# Patient Record
Sex: FEMALE | Race: WHITE | NOT HISPANIC OR LATINO | Employment: OTHER | URBAN - METROPOLITAN AREA
[De-identification: names, ages, dates, MRNs, and addresses within clinical notes are randomized per-mention and may not be internally consistent; named-entity substitution may affect disease eponyms.]

---

## 2023-01-18 ENCOUNTER — TELEPHONE (OUTPATIENT)
Dept: SPORTS MEDICINE | Facility: CLINIC | Age: 63
End: 2023-01-18
Payer: COMMERCIAL

## 2023-01-18 ENCOUNTER — TELEPHONE (OUTPATIENT)
Dept: ORTHOPEDICS | Facility: CLINIC | Age: 63
End: 2023-01-18
Payer: COMMERCIAL

## 2023-01-18 DIAGNOSIS — M25.561 RIGHT KNEE PAIN, UNSPECIFIED CHRONICITY: Primary | ICD-10-CM

## 2023-01-18 NOTE — TELEPHONE ENCOUNTER
Pt stated that she has not had an MRI. I explained that we will schedule w/ non-op to determine if surgical eval is necessary. Pt understood and scheduled next day appt. Pt verbalized understanding of appt time, date, location, and xr arrival. She denied any Hx of Sx    ----- Message from Angie Tubbs sent at 1/18/2023  2:14 PM CST -----  Please call pt @ [phone] regarding referral for [facility] Dr Pederson, need to know if received.

## 2023-01-18 NOTE — TELEPHONE ENCOUNTER
Explained initial work up w/ non op since pt has next-day appt. Pt understood that she will be referred to Dr. Arriola if Dr. Del Cid suggests surgical opinion. Pt understood.     ----- Message from Mahendra Genao sent at 1/18/2023  3:48 PM CST -----  Pt is calling to see if dr. Duran sees knee pts . Pls call her back at [phone]   Thank you    Nils

## 2023-01-19 ENCOUNTER — HOSPITAL ENCOUNTER (OUTPATIENT)
Dept: RADIOLOGY | Facility: HOSPITAL | Age: 63
Discharge: HOME | End: 2023-01-19
Attending: STUDENT IN AN ORGANIZED HEALTH CARE EDUCATION/TRAINING PROGRAM
Payer: COMMERCIAL

## 2023-01-19 ENCOUNTER — OFFICE VISIT (OUTPATIENT)
Dept: SPORTS MEDICINE | Facility: CLINIC | Age: 63
End: 2023-01-19
Payer: COMMERCIAL

## 2023-01-19 VITALS — WEIGHT: 102.06 LBS | BODY MASS INDEX: 19.93 KG/M2

## 2023-01-19 DIAGNOSIS — M22.2X1 PATELLOFEMORAL PAIN SYNDROME OF RIGHT KNEE: ICD-10-CM

## 2023-01-19 DIAGNOSIS — M17.11 PRIMARY OSTEOARTHRITIS OF RIGHT KNEE: Primary | ICD-10-CM

## 2023-01-19 DIAGNOSIS — M25.561 RIGHT KNEE PAIN, UNSPECIFIED CHRONICITY: ICD-10-CM

## 2023-01-19 PROCEDURE — 73564 X-RAY EXAM KNEE 4 OR MORE: CPT | Mod: TC,RT

## 2023-01-19 PROCEDURE — 1159F PR MEDICATION LIST DOCUMENTED IN MEDICAL RECORD: ICD-10-PCS | Mod: CPTII,S$GLB,, | Performed by: STUDENT IN AN ORGANIZED HEALTH CARE EDUCATION/TRAINING PROGRAM

## 2023-01-19 PROCEDURE — 3008F PR BODY MASS INDEX (BMI) DOCUMENTED: ICD-10-PCS | Mod: CPTII,S$GLB,, | Performed by: STUDENT IN AN ORGANIZED HEALTH CARE EDUCATION/TRAINING PROGRAM

## 2023-01-19 PROCEDURE — 3008F BODY MASS INDEX DOCD: CPT | Mod: CPTII,S$GLB,, | Performed by: STUDENT IN AN ORGANIZED HEALTH CARE EDUCATION/TRAINING PROGRAM

## 2023-01-19 PROCEDURE — 1160F PR REVIEW ALL MEDS BY PRESCRIBER/CLIN PHARMACIST DOCUMENTED: ICD-10-PCS | Mod: CPTII,S$GLB,, | Performed by: STUDENT IN AN ORGANIZED HEALTH CARE EDUCATION/TRAINING PROGRAM

## 2023-01-19 PROCEDURE — 73564 X-RAY EXAM KNEE 4 OR MORE: CPT | Mod: 26,RT,, | Performed by: RADIOLOGY

## 2023-01-19 PROCEDURE — 1160F RVW MEDS BY RX/DR IN RCRD: CPT | Mod: CPTII,S$GLB,, | Performed by: STUDENT IN AN ORGANIZED HEALTH CARE EDUCATION/TRAINING PROGRAM

## 2023-01-19 PROCEDURE — 99999 PR PBB SHADOW E&M-EST. PATIENT-LVL III: ICD-10-PCS | Mod: PBBFAC,,, | Performed by: STUDENT IN AN ORGANIZED HEALTH CARE EDUCATION/TRAINING PROGRAM

## 2023-01-19 PROCEDURE — 73562 X-RAY EXAM OF KNEE 3: CPT | Mod: 26,LT,, | Performed by: RADIOLOGY

## 2023-01-19 PROCEDURE — 99999 PR PBB SHADOW E&M-EST. PATIENT-LVL III: CPT | Mod: PBBFAC,,, | Performed by: STUDENT IN AN ORGANIZED HEALTH CARE EDUCATION/TRAINING PROGRAM

## 2023-01-19 PROCEDURE — 73564 XR KNEE ORTHO RIGHT WITH FLEXION: ICD-10-PCS | Mod: 26,RT,, | Performed by: RADIOLOGY

## 2023-01-19 PROCEDURE — 99204 PR OFFICE/OUTPT VISIT, NEW, LEVL IV, 45-59 MIN: ICD-10-PCS | Mod: S$GLB,,, | Performed by: STUDENT IN AN ORGANIZED HEALTH CARE EDUCATION/TRAINING PROGRAM

## 2023-01-19 PROCEDURE — 73562 XR KNEE ORTHO RIGHT WITH FLEXION: ICD-10-PCS | Mod: 26,LT,, | Performed by: RADIOLOGY

## 2023-01-19 PROCEDURE — 99204 OFFICE O/P NEW MOD 45 MIN: CPT | Mod: S$GLB,,, | Performed by: STUDENT IN AN ORGANIZED HEALTH CARE EDUCATION/TRAINING PROGRAM

## 2023-01-19 PROCEDURE — 1159F MED LIST DOCD IN RCRD: CPT | Mod: CPTII,S$GLB,, | Performed by: STUDENT IN AN ORGANIZED HEALTH CARE EDUCATION/TRAINING PROGRAM

## 2023-01-19 RX ORDER — MELOXICAM 7.5 MG/1
7.5 TABLET ORAL 2 TIMES DAILY PRN
Qty: 60 TABLET | Refills: 2 | Status: SHIPPED | OUTPATIENT
Start: 2023-01-19

## 2023-01-19 NOTE — PROGRESS NOTES
Patient ID: Althea Marcial  YOB: 1960  MRN: [id number]    Chief Complaint: Pain and Swelling of the Right Knee    Referred By: Self    Occupation: Retired    History of Present Illness: Althea Marcial is a 62 y.o. female who presents today with right knee pain.     She complains of worsening knee pain since summer 2022 without injury.  She reports that the pain started after doing a lot of squatting and yardwork.  She has noticed intermittent pain and swelling and the presence of a knot in the back of her knee that changes in size and severity based on her activity.  Sometimes it will make the whole back of her leg swell.  Her symptoms are worse with prolonged sitting/driving, when lying down at night, and when there is direct pressure on the back of her knee.  When her knee is swollen she is unable to bend it.  She has self treated with Aleve.      Past Medical History:   Past Medical History:   Diagnosis Date    Basal cell carcinoma     Elevated liver enzymes     Ovarian cyst     RIGHT SIMPLE OVARIAN CYST (6 X 3.5 X 5.3 CM)     Past Surgical History:   Procedure Laterality Date     SECTION      DILATION AND CURETTAGE OF UTERUS      [facility]  2022    ovarian cyst     History reviewed. No pertinent family history.  Social History     Socioeconomic History    Marital status:    Tobacco Use    Smoking status: Never    Smokeless tobacco: Never   Substance and Sexual Activity    Alcohol use: Never    Drug use: Never    Sexual activity: Not Currently     Medication List with Changes/Refills   New Medications    MELOXICAM (MOBIC) 7.5 MG TABLET    Take 1 tablet (7.5 mg total) by mouth 2 (two) times daily as needed for Pain.   Current Medications    BIOTIN 10 MG TAB    1 tablet    CALCIUM-VITAMIN D3 (OS-ROBERTH 500 + D3) 500 MG-5 MCG (200 UNIT) PER TABLET    Take 1 tablet by mouth 2 (two) times daily with meals.    LORATADINE (CLARITIN) 5 MG/5 ML SYRUP    10 mLs.    MULTIVITAMIN (THERAGRAN)  PER TABLET    1 tablet    VITAMIN E 100 UNIT CAPSULE    1 capsule     Review of patient's allergies indicates:   Allergen Reactions    Aspirin     Penicillins Rash    Tetracyclines Rash       Physical Exam:   Body mass index is 19.93 kg/m².    Physical Exam  Constitutional:       General: She is not in acute distress.     Appearance: Normal appearance.   HENT:      Head: Normocephalic and atraumatic.   Eyes:      Extraocular Movements: Extraocular movements intact.      Conjunctiva/sclera: Conjunctivae normal.   Pulmonary:      Effort: Pulmonary effort is normal. No respiratory distress.   Skin:     General: Skin is warm and dry.   Neurological:      General: No focal deficit present.      Mental Status: She is alert and oriented to person, place, and time.   Psychiatric:         Mood and Affect: Mood normal.     Detailed MSK exam:      Right Knee:  Inspection: Genu valgus    No effusion    Small palpable popliteal cyst  Palpation tenderness: Medial joint line  Range of motion: 0 deg extension - 120 deg flexion  Strength:  5/5 Extension    5/5 Flexion  Stability: Stable ACL/Lachman      Stable Posterior Drawer      1+ with firm endpoint to Valgus Stress      Stable Varus Stress  Patella Exam: Negative J-sign   Negative Patellar apprehension   Negative Patellar crepitus   N/V Exam:  Tibial:    Normal sensory (plantar foot)  Normal motor (FHL)    Sup Peroneal:   Normal sensory (dorsal foot)  Normal motor (Peroneals)            Deep Peroneal:   Normal sensory (1st web space)  Normal motor (EHL)    Sural:   Normal sensory (lateral foot)   Saphenous:   Normal sensory (medial lower leg)   Normal pedal pulses, warm and well perfused with capillary refill < 2 sec       Imaging:  X-ray Knee Ortho Right with Flexion  Narrative: EXAMINATION:  XR KNEE ORTHO RIGHT WITH FLEXION    CLINICAL HISTORY:  Pain in right knee    TECHNIQUE:  Standing AP, standing PA flexion, and Merchant views were obtained of the bilateral knees.   Standing lateral view of the right knee was obtained.    COMPARISON:  None.    FINDINGS:  No acute fractures or dislocations visualized.  A small joint effusion is present on the right.  There is bilateral lateral patellar tilt, worse on the right.  Joint spaces are preserved.  Impression: As Above    Electronically signed by: Alfonso Benavidez MD  Date:    01/19/2023  Time:    08:14      Relevant imaging results were reviewed and interpreted by me and per my read:  Well-preserved joint spaces, bilaterally.  There is some subchondral sclerosis in the medial compartments, bilaterally.  No significant osteophytic changes.  Lateral patellar tilt, bilaterally.  Normal alignment.  No fractures or other acute abnormalities    This was discussed with the patient and / or family today.       Patient Instructions   Assessment:  Althea Marcial is a 62 y.o. female with a chief complaint of Pain and Swelling of the Right Knee    Encounter Diagnoses   Name Primary?    Primary osteoarthritis of right knee Yes    Patellofemoral pain syndrome of right knee       Plan:  XR reviewed - some mild medial joint space sclerosis, but overall well-maintained joint spaces, minimal osteophytic changes, and lateral patellar tilt  The patient's history, clinical exam, and imaging findings are consistent with early osteoarthritis and patellofemoral pain syndrome of the right knee, with resultant Baker cyst  Recommend for conservative management  Prescribe Mobic 7.5mg once or twice daily as needed, we will take b.i.d. for the next 2 weeks, then down to b.i.d. p.r.n. after that  Order PT at [facility] - 2-3x per week for 6-8 weeks   Recommend she obtain over-the-counter compression sleeve  Can consider CSI, Baker cyst aspiration in future if not responding    Follow-up: 3 months or sooner if there are any problems between now and then.    Thank you for choosing [facility] and Dr. Aubrey Del Cid for your orthopedic &  sports medicine care. It is our goal to provide you with exceptional care that will help keep you healthy, active, and get you back in the game.    Please do not hesitate to reach out to us via email, phone, or MyChart with any questions, concerns, or feedback.    If you are experiencing pain/discomfort ,or have questions after 5pm and would like to be connected to the [facility] on-call team, please call this number and specify which Sports Medicine provider is treating you: [phone]          A copy of today's visit note has been sent to the referring provider.       Aubrey Del Cid MD  Primary Care Sports Medicine        Disclaimer: This note was prepared using a voice recognition system and is likely to have sound alike errors within the text.

## 2023-01-19 NOTE — PATIENT INSTRUCTIONS
Assessment:  Althea Marcial is a 62 y.o. female with a chief complaint of Pain and Swelling of the Right Knee    Encounter Diagnoses   Name Primary?    Primary osteoarthritis of right knee Yes    Patellofemoral pain syndrome of right knee       Plan:  XR reviewed - some mild medial joint space sclerosis, but overall well-maintained joint spaces, minimal osteophytic changes, and lateral patellar tilt  The patient's history, clinical exam, and imaging findings are consistent with early osteoarthritis and patellofemoral pain syndrome of the right knee, with resultant Baker cyst  Recommend for conservative management  Prescribe Mobic 7.5mg once or twice daily as needed, we will take b.i.d. for the next 2 weeks, then down to b.i.d. p.r.n. after that  Order PT at [facility] - 2-3x per week for 6-8 weeks   Recommend she obtain over-the-counter compression sleeve  Can consider CSI, Baker cyst aspiration in future if not responding    Follow-up: 3 months or sooner if there are any problems between now and then.    Thank you for choosing [facility] and Dr. Aubrey Del Cid for your orthopedic & sports medicine care. It is our goal to provide you with exceptional care that will help keep you healthy, active, and get you back in the game.    Please do not hesitate to reach out to us via email, phone, or MyChart with any questions, concerns, or feedback.    If you are experiencing pain/discomfort ,or have questions after 5pm and would like to be connected to the [facility] on-call team, please call this number and specify which Sports Medicine provider is treating you: [phone]

## 2023-01-20 ENCOUNTER — CLINICAL SUPPORT (OUTPATIENT)
Dept: REHABILITATION | Facility: HOSPITAL | Age: 63
End: 2023-01-20
Attending: STUDENT IN AN ORGANIZED HEALTH CARE EDUCATION/TRAINING PROGRAM
Payer: COMMERCIAL

## 2023-01-20 DIAGNOSIS — M22.2X1 PATELLOFEMORAL PAIN SYNDROME OF RIGHT KNEE: ICD-10-CM

## 2023-01-20 DIAGNOSIS — R68.89 DECREASED STRENGTH, ENDURANCE, AND MOBILITY: ICD-10-CM

## 2023-01-20 DIAGNOSIS — Z74.09 DECREASED STRENGTH, ENDURANCE, AND MOBILITY: ICD-10-CM

## 2023-01-20 DIAGNOSIS — M17.11 PRIMARY OSTEOARTHRITIS OF RIGHT KNEE: ICD-10-CM

## 2023-01-20 DIAGNOSIS — R53.1 DECREASED STRENGTH, ENDURANCE, AND MOBILITY: ICD-10-CM

## 2023-01-20 PROCEDURE — 97112 NEUROMUSCULAR REEDUCATION: CPT

## 2023-01-20 PROCEDURE — 97110 THERAPEUTIC EXERCISES: CPT

## 2023-01-20 PROCEDURE — 97162 PT EVAL MOD COMPLEX 30 MIN: CPT

## 2023-01-20 NOTE — PLAN OF CARE
[facility]   Physical Therapy Initial Evaluation   Date: 1/20/2023   Name: Althea Marcial  Clinic Number: [id number]    Therapy Diagnosis:    Encounter Diagnoses   Name Primary?    Primary osteoarthritis of right knee     Patellofemoral pain syndrome of right knee     Decreased strength, endurance, and mobility       Physician: Aubrey Del Cid MD     Physician Orders: PT Eval and Treat  Medical Diagnosis from Referral: M17.11 (ICD-10-CM) - Primary osteoarthritis of right knee  M22.2X1 (ICD-10-CM) - Patellofemoral pain syndrome of right knee  Evaluation Date: 1/20/2023  Authorization Period Expiration: 12/31/2023  Plan of Care Expiration: 4/20/2023  Progress Note Due: 2/20/2023  Visit # / Visits authorized: 1/1   FOTO: 1/3 (last performed on 1/20/2023)    Precautions: Standard    Time In: 1206  Time Out: 1300  Total Billable Time (timed & untimed codes): 54 minutes    SUBJECTIVE   Date of onset: Spring/Summer 2022    History of current condition - Althea reports that around Spring/Summer of 2022 she did a lot of squatting in the yard when doing yard work one day and a few days later began having pain. Patient later revealed that around this time she had a stress fracture occur in her left foot which required her to be in a boot for a period of time and after getting out of the boot is when she did this labor some day of yard work. Patient reports that she has never had pain when squatting before then. Patient has young grandchildren and is very active in their lives. She has noticed that she on the floor with them with her knee in a hyper flexed position, when kneeling on her knee or when transitioning from sweetie cross sitting to standing she feels pressure and pain in her right knee. Patient reports that the knee pain comes and goes.  Patient reports that her parents are older and their health is declining so she began travelling to [place] more to visit them and that is when she noticed  her knee pain would bother her when riding in the car for long durations. Patient's father passed away in October so she is not travelling there as frequently. Patient reports that she had a hysterectomy following this as she has a cyst on her ovary that was worrisome although was benign. Patient noticed that the immobility following this procedure followed by mobility is what flared up her knee enough to see her MD for it.     Imaging: [x] Xray [] MRI [] CT: Performed on: Knee 2022    Pain:  Current 0/10, worst 7/10, best 0/10   Location: [x] Right   [] Left:  knee  Description: Aching and Throbbing  Aggravating Factors: Squatting, sitting for long periods of time  Easing Factors: activity avoidance, rest    Prior Therapy:   [x] N/A    [] Yes:   Social History: Pt lives with their spouse  Occupation: Pt is retired.   Prior Level of Function: Independent and pain free with all ADL, IADL, community mobility and functional activities.   Current Level of Function: Independent with all ADL, IADL, community mobility and functional activities with reports of increased pain and need for increased time and frequent breaks.  By the end of the day will have a good bit of swelling in her right knee with increased pain as well. Is unable to squat currently. Has difficulty when right knee is in hyper flexed position, putting pressure through knee as when kneeling and when riding in a car for long durations.     Dominant Extremity:    [x] Right    [] Left    Pts goals: Pt reported goals are to decrease overall pain levels in order to return to prior functional level.     Medical History:   Past Medical History:   Diagnosis Date    Basal cell carcinoma     Elevated liver enzymes     Ovarian cyst     RIGHT SIMPLE OVARIAN CYST (6 X 3.5 X 5.3 CM)       Surgical History:   Althea Marcial  has a past surgical history that includes  section; Dilation and curettage of uterus; and RALH BSO (2022).    Medications:   Althea  has a current medication list which includes the following prescription(s): biotin, calcium-vitamin d3, loratadine, meloxicam, multivitamin, and vitamin e.    Allergies:   Review of patient's allergies indicates:   Allergen Reactions    Aspirin     Penicillins Rash    Tetracyclines Rash        OBJECTIVE     Range of Motion:    Hip and Ankle Mobility was screened and was normal and pain free    Knee AROM/PROM Right Left Pain/Dysfunction with Movement Goal   Knee Flexion (135º) 135 138 Pain in right knee    Knee Extension (0º) 0 0 Pressure in right knee       Strength:    L/E MMT Right  (spine) Left Pain/Dysfunction with Movement Goal   Hip Flexion  3+/5 4-/5 No pain 4+/5 B   Hip Extension  3+/5 4-/5 No pain 4+/5 B   Hip Abduction  3+/5 3+/5 No pain 4+/5 B   Knee Extension 4-/5 4/5 No pain 5/5 B   Knee Flexion 3+/5 3+/5 No pain 5/5 B   Hip IR 3+/5 3+/5 No pain 4+/5 B   Hip ER 3+/5 3+/5 No pain 4+/5 B   Ankle DF 4-/5 4-/5 No pain 5/5 B   Ankle PF 4-/5 4-/5 No pain 5/5 B     Muscle Length:       Muscle Tested  Right Left  Goal   Hamstrings  [] Normal      [x] Limited [] Normal      [x] Limited Normal B   Gastrocnemius  [] Normal      [x] Limited [] Normal      [x] Limited Normal B     Joint Mobility:     Joint Motion Right Mobility  (spine) Left Mobility Goal   Knee Distraction  [x] Hypo     [] Normal     [] Hyper [] Hypo     [x] Normal     [] Hyper Normal    Distal Femur AP [x] Hypo     [] Normal     [] Hyper [] Hypo     [x] Normal     [] Hyper Normal    Proximal Tibia AP [x] Hypo     [] Normal     [] Hyper [] Hypo     [x] Normal     [] Hyper Normal    Patellar Medial Glide  [x] Hypo     [] Normal     [] Hyper [] Hypo     [x] Normal     [] Hyper Normal    Patellar Lateral Glide  [x] Hypo     [] Normal     [] Hyper [] Hypo     [x] Normal     [] Hyper Normal    Patellar Superior Glide  [x] Hypo     [] Normal     [] Hyper [] Hypo     [x] Normal     [] Hyper Normal    Patellar Inferior Glide  [x] Hypo     [] Normal     []  Hyper [] Hypo     [x] Normal     [] Hyper Normal      Sensation:  [x] Intact to Light Touch   [] Impaired:    Palpation: Increased tone noted with palpation of right quadriceps, vastus medialis/VMO, medial hamstrings, hip adductors , and popliteus. Increased tenderness with palpation of the following structures: medial tibiofemoral joint line , lateral tibiofemoral joint line , and pes anserine     Posture:  Pt presents with postural abnormalities which include:    [x] Forward Head   [] Increased Lumbar Lordosis   [x] Rounded Shoulder   [] Genu Recurvatum   [] Increased Thoracic Kyphosis [x] Genu Valgus   [] Trunk Deviated    [] Pes Planus   [] Scapular Winging    [] Other:       PT Functional Testing: Gait Analysis: The patient ambulated with the following assistive device: none; the pt presents with the following gait abnormalities: trendelenburg, decreased knee flexion on right, and decreased knee extension on right,     Functional Movement  Analysis Notes   Sit to Stand []Functional  [x]Dysfunctional:  [x]Painful  []Non-Painful    Dynamic valgus present with knees touching are mid range secondary to weakness   Squat []Functional  [x]Dysfunctional:  [x]Painful  []Non-Painful    Dynamic valgus present with knees touching are mid range secondary to weakness   Single Leg Squat  []Functional  []Dysfunctional:  []Painful  []Non-Painful    Not tested today   Step Down  []Functional  []Dysfunctional:  []Painful  []Non-Painful    Not tested today     Function:     CMS Impairment/Limitation/Restriction for FOTO Knee Survey    Therapist reviewed FOTO scores for Althea on 1/20/2023.   FOTO documents entered into [employer] - see Media section.    Limitation Score: 39%         TREATMENT     Total Treatment time (time-based codes) separate from Evaluation: (18) minutes     Althea received the treatments listed below:      THERAPEUTIC EXERCISES to develop strength, endurance, ROM, flexibility, posture, and core stabilization for (10)  minutes including:    Intervention Performed Today    Educated patient on the importance of improving lower extremity strength bilaterally while also normalizing movement patterns x Plan to look at left foot more extensively in coming visits to assess deficits/impairments there as this could also be part of the reason for her continued pain   Educated patient on the proper form and sequencing of all exercises provided in HEP today x      Plan for Next Visit: Hip 7 Ways completed, standing hip 3 ways, bridges, prone hip extension, squats       NEUROMUSCULAR RE-EDUCATION ACTIVITIES to improve Balance, Coordination, Kinesthetic, Sense, Proprioception, and Posture for (8) minutes.  The following were included:    Intervention Performed Today    Educated patient on the proper form and sequencing of a squats with time spent correcting and improving current form as to not exacerbate symptoms further x    Educated patient on putting car in cruise control when safe and appropriate for short durations to allow for right knee mobility intermittently through long car rides x      PATIENT EDUCATION AND HOME EXERCISES     Education provided: (included in billable time)   PURPOSE: Patient educated on the impairments noted above and the effects of physical therapy intervention to improve overall condition and QOL.   EXERCISE: Patient was educated on all the above exercise prior/during/after for proper posture, positioning, and execution for safe performance with home exercise program.   STRENGTH: Patient educated on the importance of improved core and extremity strength in order to improve alignment of the spine and extremities with static positions and dynamic movement.   GAIT & BALANCE: Patient educated on the importance of strong core and lower extremity musculature in order to improve both static and dynamic balance, improve gait mechanics, reduce fall risk and improve household and community mobility.   POSTURE: Patient  educated on postural awareness to reduce stress and maintain optimal alignment of the spine with static positions and dynamic movement   TRANSFERS & TRANSITIONS: Patient educated on proper technique for bed mobility, transitions and transfers to improve body mechanics and decrease risk of injury.     Written Home Exercises Provided: yes.  Exercises were reviewed and Althea was able to demonstrate them prior to the end of the session.  Althea demonstrated good  understanding of the education provided. See EMR under Patient Instructions for exercises provided during therapy sessions.    ASSESSMENT     Althea is a 62 y.o. female referred to outpatient Physical Therapy with a medical diagnosis of M17.11 (ICD-10-CM) - Primary osteoarthritis of right knee, M22.2X1 (ICD-10-CM) - Patellofemoral pain syndrome of right knee. Pt presents with impairments including: decreased ROM, decreased strength, decreased joint mobility, decreased muscle length, impaired coordination, impaired balance, postural abnormalities, gait abnormalities, and decreased overall function resulting in decreased quality of life and functional independence.     Pt prognosis is Excellent.   Pt will benefit from skilled outpatient Physical Therapy to address the deficits stated above and in the chart below, provide pt/family education, and to maximize pt's level of independence.     Plan of care discussed with patient: Yes  Pt's spiritual, cultural and educational needs considered and patient is agreeable to the plan of care and goals as stated below:     Anticipated Barriers for therapy: chronicity of condition    Medical Necessity is demonstrated by the following  History  Co-morbidities and personal factors that may impact the plan of care Co-morbidities:   prior abdominal surgery and chronicity of condition, left foot stress fracture    Personal Factors:   []Age   []Education   []Coping style   [x]Social background   [x]Lifestyle    []Character   []Attitudes  "  []Other:   []No deficits      []Low   [x]Moderate  []High    Examination  Body Structures and Functions, activity limitations and participation restrictions that may impact the plan of care Body Regions:   []Head  []Neck   []Back   []Trunk  []Upper extremities   [x]Lower extremities   []Other:      Body Systems:    [x]Gross symmetry   [x]ROM   [x]Strength   [x]Gross coordinated movement   [x]Balance   [x]Gait [x]Transfers  [x]Transitions   [x]Motor control   [x]Motor learning   []Scar formation  []Other:       Participation Restrictions:   See above in "Current Level of Function"     Activity limitations:   Learning and applying knowledge  [x]No deficits       General Tasks and Commands  [x]No deficits    Communication  [x]No deficits    Mobility   []No deficits  []Fine hand use  [x]Walking   [x]Driving [x]Lifting/carrying objects  []Using Transportation   []Moving around using equipment  []Other:      Self care  []No deficits  []Washing oneself   []Caring for body parts   []Toileting   [x]Dressing  []Eating   []Drinking   []Looking after one's health  []Other:        Domestic Life  []No Deficits  [x]Shopping   [x]Cooking  [x]Doing housework  [x]Assisting others   [x]Other: yardwork, playing with grandchildren     Interactions/Relationships  [x]No deficits    Life Areas  [x]No deficits    Community and Social Life  [x]Community life  [x]Recreation and leisure   []Scientologist and spirituality  []Human rights   []Political life / citizenship  []No deficits      []Low   [x]Moderate  []High    Clinical Presentation []Stable and uncomplicated   [x]Evolving presentation with changing characteristics  []Unstable presentation with unpredictable characteristics []Low   [x]Moderate  []High      Decision Making/ Complexity Score:   []Low   [x]Moderate  []High        Short Term Goals:  6 weeks Status  Date Met   PAIN: Pt will report worst pain of 4/10 in order to progress toward max functional ability and improve quality of " life. [x] Progressing  [] Met  [] Not Met    FUNCTION: Patient will demonstrate improved function as indicated by a functional limitation score of less than or equal to 32%  on FOTO. [x] Progressing  [] Met  [] Not Met    STRENGTH: Patient will improve strength to 50% of stated goals, listed in objective measures above, in order to progress towards independence with functional activities. [x] Progressing  [] Met  [] Not Met    POSTURE: Patient will correct postural deviations in sitting and standing, to decrease pain and promote long term stability.  [x] Progressing  [] Met  [] Not Met    HEP: Patient will demonstrate independence with HEP in order to progress toward functional independence. [x] Progressing  [] Met  [] Not Met    Will assess left foot to see if any deficits are present that could be aiding in current pain and dysfunction to assist in long term improvements in pain.  [x] Progressing  [] Met  [] Not Met      Long Term Goals:  12 weeks Status Date Met   PAIN: Pt will report worst pain of 1/10 in order to progress toward max functional ability and improve quality of life [x] Progressing  [] Met  [] Not Met    FUNCTION: Patient will demonstrate improved function as indicated by a functional limitation score of less than or equal to 27% on FOTO. [x] Progressing  [] Met  [] Not Met    STRENGTH: Patient will improve strength to stated goals, listed in objective measures above, in order to improve functional independence and quality of life. [x] Progressing  [] Met  [] Not Met    GAIT: Patient will demonstrate normalized gait mechanics with minimal compensation in order to return to PLOF. [x] Progressing  [] Met  [] Not Met    Patient will return to normal ADL's, IADL's, community involvement, recreational activities, and work-related activities with less than or equal to 1/10 pain and maximal function.  [x] Progressing  [] Met  [] Not Met      PLAN   Plan of care Certification: 1/20/2023 to  4/20/2023.    Outpatient Physical Therapy 1-2 times weekly for 12 weeks to include any combination of the following interventions: virtual visits, dry needling, modalities, electrical stimulation (IFC, Pre-Mod, Attended with Functional Dry Needling), Aquatic Therapy, Cervical/Lumbar Traction, Electrical Stimulation unattended/attended, Gait Training, Manual Therapy, Neuromuscular Re-ed, Patient Education, Self Care, Therapeutic Exercise, and Therapeutic Activites     Gay Hunter, PT, DPT      I CERTIFY THE NEED FOR THESE SERVICES FURNISHED UNDER THIS PLAN OF TREATMENT AND WHILE UNDER MY CARE   Physician's comments:     Physician's Signature: ___________________________________________________

## 2023-01-26 ENCOUNTER — CLINICAL SUPPORT (OUTPATIENT)
Dept: REHABILITATION | Facility: HOSPITAL | Age: 63
End: 2023-01-26
Payer: COMMERCIAL

## 2023-01-26 DIAGNOSIS — Z74.09 DECREASED STRENGTH, ENDURANCE, AND MOBILITY: Primary | ICD-10-CM

## 2023-01-26 DIAGNOSIS — R68.89 DECREASED STRENGTH, ENDURANCE, AND MOBILITY: Primary | ICD-10-CM

## 2023-01-26 DIAGNOSIS — R53.1 DECREASED STRENGTH, ENDURANCE, AND MOBILITY: Primary | ICD-10-CM

## 2023-01-26 PROCEDURE — 97112 NEUROMUSCULAR REEDUCATION: CPT

## 2023-01-26 PROCEDURE — 97110 THERAPEUTIC EXERCISES: CPT

## 2023-01-26 NOTE — PROGRESS NOTES
[facility]   Physical Therapy Treatment Note     Name: Althea HOLLIS [facility]  Clinic Number: [id number]    Therapy Diagnosis:   Encounter Diagnosis   Name Primary?    Decreased strength, endurance, and mobility Yes     Physician: Aubrey Del Cid MD    Visit Date: 1/26/2023    Physician Orders: PT Eval and Treat  Medical Diagnosis from Referral: M17.11 (ICD-10-CM) - Primary osteoarthritis of right knee  M22.2X1 (ICD-10-CM) - Patellofemoral pain syndrome of right knee  Evaluation Date: 1/20/2023  Authorization Period Expiration: 12/31/2023  Plan of Care Expiration: 4/20/2023  Progress Note Due: 2/20/2023  Visit # / Visits authorized: 1/20 (+1 for evaluation)  FOTO: 1/3 (performed on 1/20/2023)    PTA Visit #: 0/5     Progress Note Due on 2/20/2023    Time In: 1347  Time Out: 1438  Total Billable Time: 49 minutes    Precautions: Standard    SUBJECTIVE     Pt reports: that she is having a little pain and burning in her anterior inferior knee. Patient drove to see her mother Tuesday and drove back yesterday in which her pain reached up to 5/10 which is improved per patient report.     She was compliant with home exercise program.  Response to previous treatment: No adverse reactions  Functional change: Less pain when sitting for long car ride    Pain: 1/10  Location: [x] Right   [] Left:  knee    OBJECTIVE     Objective Measures updated at progress report unless specified.     Treatment     Althea received the treatments listed below:     THERAPEUTIC EXERCISES to develop strength, endurance, ROM, flexibility, posture, and core stabilization for (10) minutes including:    Intervention Performed Today    Straight Leg Raise  x 2 lbs 2x10 reps                                  Recumbent Bike x 6 minutes level 3     Plan for Next Visit:      NEUROMUSCULAR RE-EDUCATION ACTIVITIES to improve Balance, Coordination, Kinesthetic, Sense, Proprioception, and Posture for (39) minutes.  The following were  included:    Intervention Performed Today    Side Lying Hip Series (progressed) x X15 reps each direction (7 ways)   Side Lying Clams (progressed) x Green theraband 5 second holds x10 reps B   Educated patient on proper form and sequencing of all exercises in HEP as well as modifications x                                Plan for Next Visit: standing hip 3 ways, bridges, prone hip extension, squats      Patient Education and Home Exercises     Home Exercises Provided and Patient Education Provided     Education provided:   - PURPOSE: Patient educated on the impairments noted above and the effects of physical therapy intervention to improve overall condition and QOL.   EXERCISE: Patient was educated on all the above exercise prior/during/after for proper posture, positioning, and execution for safe performance with home exercise program.   STRENGTH: Patient educated on the importance of improved core and extremity strength in order to improve alignment of the spine and extremities with static positions and dynamic movement.   GAIT & BALANCE: Patient educated on the importance of strong core and lower extremity musculature in order to improve both static and dynamic balance, improve gait mechanics, reduce fall risk and improve household and community mobility.   POSTURE: Patient educated on postural awareness to reduce stress and maintain optimal alignment of the spine with static positions and dynamic movement     Written Home Exercises Provided: Patient instructed to cont prior HEP. Exercises were reviewed and Althea was able to demonstrate them prior to the end of the session.  Althea demonstrated good  understanding of the education provided. See EMR under Patient Instructions for exercises provided during therapy sessions      ASSESSMENT     Patient tolerated treatment well today with improvement of pain by the end of the session. Went through HEP exercises to ensure proper form and sequencing and made modifications  from how she had been performing at home for better muscle engagement. Patient with considerable fatigue although more prominent on left than right.     Althea Is progressing well towards her goals.   Pt prognosis is Excellent.     Pt will continue to benefit from skilled outpatient physical therapy to address the deficits listed in the problem list box on initial evaluation, provide pt/family education and to maximize pt's level of independence in the home and community environment.     Pt's spiritual, cultural and educational needs considered and pt agreeable to plan of care and goals.     Anticipated barriers to physical therapy: chronicity of condition    Goals:      Short Term Goals:  6 weeks Status  Date Met   PAIN: Pt will report worst pain of 4/10 in order to progress toward max functional ability and improve quality of life. [x] Progressing  [] Met  [] Not Met     FUNCTION: Patient will demonstrate improved function as indicated by a functional limitation score of less than or equal to 32%  on FOTO. [x] Progressing  [] Met  [] Not Met     STRENGTH: Patient will improve strength to 50% of stated goals, listed in objective measures above, in order to progress towards independence with functional activities. [x] Progressing  [] Met  [] Not Met     POSTURE: Patient will correct postural deviations in sitting and standing, to decrease pain and promote long term stability.  [x] Progressing  [] Met  [] Not Met     HEP: Patient will demonstrate independence with HEP in order to progress toward functional independence. [x] Progressing  [] Met  [] Not Met     Will assess left foot to see if any deficits are present that could be aiding in current pain and dysfunction to assist in long term improvements in pain.  [x] Progressing  [] Met  [] Not Met        Long Term Goals:  12 weeks Status Date Met   PAIN: Pt will report worst pain of 1/10 in order to progress toward max functional ability and improve quality of life [x]  Progressing  [] Met  [] Not Met     FUNCTION: Patient will demonstrate improved function as indicated by a functional limitation score of less than or equal to 27% on FOTO. [x] Progressing  [] Met  [] Not Met     STRENGTH: Patient will improve strength to stated goals, listed in objective measures above, in order to improve functional independence and quality of life. [x] Progressing  [] Met  [] Not Met     GAIT: Patient will demonstrate normalized gait mechanics with minimal compensation in order to return to PLOF. [x] Progressing  [] Met  [] Not Met     Patient will return to normal ADL's, IADL's, community involvement, recreational activities, and work-related activities with less than or equal to 1/10 pain and maximal function.  [x] Progressing  [] Met  [] Not Met          PLAN     Continue POC and frequency as planned. Continue to progress LE strength and conditioning program to tolerance.      These services are reasonable and necessary for the conditions set forth above while under my care.    Gya Hunter, PT, DPT

## 2023-02-03 ENCOUNTER — CLINICAL SUPPORT (OUTPATIENT)
Dept: REHABILITATION | Facility: HOSPITAL | Age: 63
End: 2023-02-03
Payer: COMMERCIAL

## 2023-02-03 DIAGNOSIS — R53.1 DECREASED STRENGTH, ENDURANCE, AND MOBILITY: Primary | ICD-10-CM

## 2023-02-03 DIAGNOSIS — R68.89 DECREASED STRENGTH, ENDURANCE, AND MOBILITY: Primary | ICD-10-CM

## 2023-02-03 DIAGNOSIS — Z74.09 DECREASED STRENGTH, ENDURANCE, AND MOBILITY: Primary | ICD-10-CM

## 2023-02-03 PROCEDURE — 97112 NEUROMUSCULAR REEDUCATION: CPT

## 2023-02-03 PROCEDURE — 97110 THERAPEUTIC EXERCISES: CPT

## 2023-02-03 NOTE — PROGRESS NOTES
[facility]   Physical Therapy Treatment Note     Name: Althea HOLLIS [facility]  Clinic Number: [id number]    Therapy Diagnosis:   Encounter Diagnosis   Name Primary?    Decreased strength, endurance, and mobility Yes     Physician: Aubrey Del Cid MD    Visit Date: 2/3/2023    Physician Orders: PT Eval and Treat  Medical Diagnosis from Referral: M17.11 (ICD-10-CM) - Primary osteoarthritis of right knee  M22.2X1 (ICD-10-CM) - Patellofemoral pain syndrome of right knee  Evaluation Date: 1/20/2023  Authorization Period Expiration: 12/31/2023  Plan of Care Expiration: 4/20/2023  Progress Note Due: 2/20/2023  Visit # / Visits authorized: 1/20 (+1 for evaluation)  FOTO: 1/3 (performed on 1/20/2023)    PTA Visit #: 0/5     Progress Note Due on 2/20/2023    Time In: 1117  Time Out: 1202  Total Billable Time: 42 minutes    Precautions: Standard    SUBJECTIVE     Pt reports: that she squatted down for a while to do something and is now aching a little bit more.     She was compliant with home exercise program.  Response to previous treatment: No adverse reactions  Functional change: Less pain when sitting for long car ride    Pain: 3/10  Location: [x] Right   [] Left:  knee    OBJECTIVE     Objective Measures updated at progress report unless specified.     Treatment     Althea received the treatments listed below:     THERAPEUTIC EXERCISES to develop strength, endurance, ROM, flexibility, posture, and core stabilization for (15) minutes including:    Intervention Performed Today    Straight Leg Raise  x 2 lbs 2x10 reps    Shuttle (added) x Double Leg 6 bands 3x10 reps                             Recumbent Bike x 6 minutes level 3     Plan for Next Visit:      NEUROMUSCULAR RE-EDUCATION ACTIVITIES to improve Balance, Coordination, Kinesthetic, Sense, Proprioception, and Posture for (27) minutes.  The following were included:    Intervention Performed Today    Side Lying Hip Series (progressed) x X10 reps each  direction (7 ways)   Side Lying Clams (progressed) x Green theraband 5 second holds x10 reps B   Educated patient on proper form and sequencing of all exercises in HEP as well as modifications                                 Plan for Next Visit: standing hip 3 ways, bridges, prone hip extension, squats      Patient Education and Home Exercises     Home Exercises Provided and Patient Education Provided     Education provided:   - PURPOSE: Patient educated on the impairments noted above and the effects of physical therapy intervention to improve overall condition and QOL.   EXERCISE: Patient was educated on all the above exercise prior/during/after for proper posture, positioning, and execution for safe performance with home exercise program.   STRENGTH: Patient educated on the importance of improved core and extremity strength in order to improve alignment of the spine and extremities with static positions and dynamic movement.   GAIT & BALANCE: Patient educated on the importance of strong core and lower extremity musculature in order to improve both static and dynamic balance, improve gait mechanics, reduce fall risk and improve household and community mobility.   POSTURE: Patient educated on postural awareness to reduce stress and maintain optimal alignment of the spine with static positions and dynamic movement     Written Home Exercises Provided: Patient instructed to cont prior HEP. Exercises were reviewed and Althea was able to demonstrate them prior to the end of the session.  Althea demonstrated good  understanding of the education provided. See EMR under Patient Instructions for exercises provided during therapy sessions      ASSESSMENT     Patient tolerated treatment well today with resolution of knee pain by the end of the session. Progressed clams with hold time as well as progressing bridges with resistance as well. Added shuttle exercises to progress strengthening program as well. Will continue to progress  strengthening program to tolerance.     Althea Is progressing well towards her goals.   Pt prognosis is Excellent.     Pt will continue to benefit from skilled outpatient physical therapy to address the deficits listed in the problem list box on initial evaluation, provide pt/family education and to maximize pt's level of independence in the home and community environment.     Pt's spiritual, cultural and educational needs considered and pt agreeable to plan of care and goals.     Anticipated barriers to physical therapy: chronicity of condition    Goals:      Short Term Goals:  6 weeks Status  Date Met   PAIN: Pt will report worst pain of 4/10 in order to progress toward max functional ability and improve quality of life. [x] Progressing  [] Met  [] Not Met     FUNCTION: Patient will demonstrate improved function as indicated by a functional limitation score of less than or equal to 32%  on FOTO. [x] Progressing  [] Met  [] Not Met     STRENGTH: Patient will improve strength to 50% of stated goals, listed in objective measures above, in order to progress towards independence with functional activities. [x] Progressing  [] Met  [] Not Met     POSTURE: Patient will correct postural deviations in sitting and standing, to decrease pain and promote long term stability.  [x] Progressing  [] Met  [] Not Met     HEP: Patient will demonstrate independence with HEP in order to progress toward functional independence. [x] Progressing  [] Met  [] Not Met     Will assess left foot to see if any deficits are present that could be aiding in current pain and dysfunction to assist in long term improvements in pain.  [x] Progressing  [] Met  [] Not Met        Long Term Goals:  12 weeks Status Date Met   PAIN: Pt will report worst pain of 1/10 in order to progress toward max functional ability and improve quality of life [x] Progressing  [] Met  [] Not Met     FUNCTION: Patient will demonstrate improved function as indicated by a  functional limitation score of less than or equal to 27% on FOTO. [x] Progressing  [] Met  [] Not Met     STRENGTH: Patient will improve strength to stated goals, listed in objective measures above, in order to improve functional independence and quality of life. [x] Progressing  [] Met  [] Not Met     GAIT: Patient will demonstrate normalized gait mechanics with minimal compensation in order to return to PLOF. [x] Progressing  [] Met  [] Not Met     Patient will return to normal ADL's, IADL's, community involvement, recreational activities, and work-related activities with less than or equal to 1/10 pain and maximal function.  [x] Progressing  [] Met  [] Not Met          PLAN     Continue POC and frequency as planned. Continue to progress LE strength and conditioning program to tolerance.      These services are reasonable and necessary for the conditions set forth above while under my care.    Gay Hunter, PT, DPT

## 2023-02-10 ENCOUNTER — CLINICAL SUPPORT (OUTPATIENT)
Dept: REHABILITATION | Facility: HOSPITAL | Age: 63
End: 2023-02-10
Payer: COMMERCIAL

## 2023-02-10 DIAGNOSIS — R53.1 DECREASED STRENGTH, ENDURANCE, AND MOBILITY: Primary | ICD-10-CM

## 2023-02-10 DIAGNOSIS — Z74.09 DECREASED STRENGTH, ENDURANCE, AND MOBILITY: Primary | ICD-10-CM

## 2023-02-10 DIAGNOSIS — R68.89 DECREASED STRENGTH, ENDURANCE, AND MOBILITY: Primary | ICD-10-CM

## 2023-02-10 PROCEDURE — 97112 NEUROMUSCULAR REEDUCATION: CPT

## 2023-02-10 PROCEDURE — 97110 THERAPEUTIC EXERCISES: CPT

## 2023-02-10 NOTE — PROGRESS NOTES
[facility]   Physical Therapy Treatment Note     Name: Althea HOLLIS [facility]  Clinic Number: [id number]    Therapy Diagnosis:   Encounter Diagnosis   Name Primary?    Decreased strength, endurance, and mobility Yes     Physician: Aubrey Del Cid MD    Visit Date: 2/10/2023    Physician Orders: PT Eval and Treat  Medical Diagnosis from Referral: M17.11 (ICD-10-CM) - Primary osteoarthritis of right knee  M22.2X1 (ICD-10-CM) - Patellofemoral pain syndrome of right knee  Evaluation Date: 1/20/2023  Authorization Period Expiration: 12/31/2023  Plan of Care Expiration: 4/20/2023  Progress Note Due: 2/20/2023  Visit # / Visits authorized: 1/20 (+1 for evaluation)  FOTO: 1/3 (performed on 1/20/2023)    PTA Visit #: 0/5     Progress Note Due on 2/20/2023    Time In: 1126  Time Out: 1208  Total Billable Time: 42 minutes    Precautions: Standard    SUBJECTIVE     Pt reports: that she is having some knee pain. Patient reports that she wore a compressive knee sleeve on her last long drive which seemed to help and she also worse it yesterday when running a bunch of errands.    She was compliant with home exercise program.  Response to previous treatment: No adverse reactions  Functional change: Less pain when sitting for long car ride    Pain: 2/10  Location: [x] Right   [] Left:  knee    OBJECTIVE     Objective Measures updated at progress report unless specified.     Treatment     Althea received the treatments listed below:     THERAPEUTIC EXERCISES to develop strength, endurance, ROM, flexibility, posture, and core stabilization for (12) minutes including:    Intervention Performed Today    Straight Leg Raise  x 2 lbs 2x10 reps    Shuttle (added)  Double Leg 6 bands 3x10 reps    Squats to Chair x X15 reps (pain)                       Recumbent Bike x 6 minutes level 3     Plan for Next Visit:      NEUROMUSCULAR RE-EDUCATION ACTIVITIES to improve Balance, Coordination, Kinesthetic, Sense, Proprioception, and  Posture for (30) minutes.  The following were included:    Intervention Performed Today    Side Lying Hip Series (progressed) x X15 reps each direction (7 ways)   Side Lying Clams (progressed) x Green theraband 10 second holds x10 reps B   Educated patient on proper form and sequencing of all exercises in HEP as well as modifications     Bridges (added) x Green theraband 3x10 reps    Prone Hip Extension (added) x 2x10 reps B   Toe Yoga (added) x 5 minutes   Towel Curls x X10 reps (education            Plan for Next Visit: standing hip 3 ways, squats      Patient Education and Home Exercises     Home Exercises Provided and Patient Education Provided     Education provided:   - PURPOSE: Patient educated on the impairments noted above and the effects of physical therapy intervention to improve overall condition and QOL.   EXERCISE: Patient was educated on all the above exercise prior/during/after for proper posture, positioning, and execution for safe performance with home exercise program.   STRENGTH: Patient educated on the importance of improved core and extremity strength in order to improve alignment of the spine and extremities with static positions and dynamic movement.   GAIT & BALANCE: Patient educated on the importance of strong core and lower extremity musculature in order to improve both static and dynamic balance, improve gait mechanics, reduce fall risk and improve household and community mobility.   POSTURE: Patient educated on postural awareness to reduce stress and maintain optimal alignment of the spine with static positions and dynamic movement     Written Home Exercises Provided: Patient instructed to cont prior HEP. Exercises were reviewed and Althea was able to demonstrate them prior to the end of the session.  Althea demonstrated good  understanding of the education provided. See EMR under Patient Instructions for exercises provided during therapy sessions      ASSESSMENT     Patient tolerated  treatment well today with better control noted with side lying hip series. Incorporated a few new hip strengthening exercises as well as foot strengthening due to recent walking boot and deficits present in left foot as compared to right in regards to strength.     Althea Is progressing well towards her goals.   Pt prognosis is Excellent.     Pt will continue to benefit from skilled outpatient physical therapy to address the deficits listed in the problem list box on initial evaluation, provide pt/family education and to maximize pt's level of independence in the home and community environment.     Pt's spiritual, cultural and educational needs considered and pt agreeable to plan of care and goals.     Anticipated barriers to physical therapy: chronicity of condition    Goals:      Short Term Goals:  6 weeks Status  Date Met   PAIN: Pt will report worst pain of 4/10 in order to progress toward max functional ability and improve quality of life. [x] Progressing  [] Met  [] Not Met     FUNCTION: Patient will demonstrate improved function as indicated by a functional limitation score of less than or equal to 32%  on FOTO. [x] Progressing  [] Met  [] Not Met     STRENGTH: Patient will improve strength to 50% of stated goals, listed in objective measures above, in order to progress towards independence with functional activities. [x] Progressing  [] Met  [] Not Met     POSTURE: Patient will correct postural deviations in sitting and standing, to decrease pain and promote long term stability.  [x] Progressing  [] Met  [] Not Met     HEP: Patient will demonstrate independence with HEP in order to progress toward functional independence. [x] Progressing  [] Met  [] Not Met     Will assess left foot to see if any deficits are present that could be aiding in current pain and dysfunction to assist in long term improvements in pain.  [x] Progressing  [] Met  [] Not Met        Long Term Goals:  12 weeks Status Date Met   PAIN: Pt  will report worst pain of 1/10 in order to progress toward max functional ability and improve quality of life [x] Progressing  [] Met  [] Not Met     FUNCTION: Patient will demonstrate improved function as indicated by a functional limitation score of less than or equal to 27% on FOTO. [x] Progressing  [] Met  [] Not Met     STRENGTH: Patient will improve strength to stated goals, listed in objective measures above, in order to improve functional independence and quality of life. [x] Progressing  [] Met  [] Not Met     GAIT: Patient will demonstrate normalized gait mechanics with minimal compensation in order to return to PLOF. [x] Progressing  [] Met  [] Not Met     Patient will return to normal ADL's, IADL's, community involvement, recreational activities, and work-related activities with less than or equal to 1/10 pain and maximal function.  [x] Progressing  [] Met  [] Not Met          PLAN     Continue POC and frequency as planned. Continue to progress LE strength and conditioning program to tolerance.      These services are reasonable and necessary for the conditions set forth above while under my care.    Gay Hunter, PT, DPT

## 2023-02-17 ENCOUNTER — CLINICAL SUPPORT (OUTPATIENT)
Dept: REHABILITATION | Facility: HOSPITAL | Age: 63
End: 2023-02-17
Payer: COMMERCIAL

## 2023-02-17 DIAGNOSIS — R68.89 DECREASED STRENGTH, ENDURANCE, AND MOBILITY: Primary | ICD-10-CM

## 2023-02-17 DIAGNOSIS — R53.1 DECREASED STRENGTH, ENDURANCE, AND MOBILITY: Primary | ICD-10-CM

## 2023-02-17 DIAGNOSIS — Z74.09 DECREASED STRENGTH, ENDURANCE, AND MOBILITY: Primary | ICD-10-CM

## 2023-02-17 PROCEDURE — 97530 THERAPEUTIC ACTIVITIES: CPT

## 2023-02-17 PROCEDURE — 97110 THERAPEUTIC EXERCISES: CPT

## 2023-02-17 PROCEDURE — 97140 MANUAL THERAPY 1/> REGIONS: CPT

## 2023-02-17 NOTE — PROGRESS NOTES
[facility]   Physical Therapy Treatment Note     Name: Althea HOLLIS [facility]  Clinic Number: [id number]    Therapy Diagnosis:   Encounter Diagnosis   Name Primary?    Decreased strength, endurance, and mobility Yes     Physician: Aubrey Del Cid MD    Visit Date: 2/17/2023    Physician Orders: PT Eval and Treat  Medical Diagnosis from Referral: M17.11 (ICD-10-CM) - Primary osteoarthritis of right knee  M22.2X1 (ICD-10-CM) - Patellofemoral pain syndrome of right knee  Evaluation Date: 1/20/2023  Authorization Period Expiration: 12/31/2023  Plan of Care Expiration: 4/20/2023  Progress Note Due: 2/20/2023  Visit # / Visits authorized: 1/20 (+1 for evaluation)  FOTO: 1/3 (performed on 1/20/2023)    PTA Visit #: 0/5     Progress Note Due on 2/20/2023    Time In: 1114  Time Out: 1204  Total Billable Time: 48 minutes (denotes billable time)    Precautions: Standard    SUBJECTIVE     Pt reports: that she is hurting a little bit more today than usual. Patient reports that she was on her feet moving around for the majority of the day yesterday which might be contributing to the increased pain.     She was compliant with home exercise program.  Response to previous treatment: No adverse reactions  Functional change: Less pain when sitting for long car ride    Pain: 3/10  Location: [x] Right   [] Left:  knee    OBJECTIVE     Objective Measures updated at progress report unless specified.     Treatment     Althea received the treatments listed below:     THERAPEUTIC EXERCISES to develop strength, endurance, ROM, flexibility, posture, and core stabilization for (25) minutes including:    Intervention Performed Today    Straight Leg Raise  x 3 lbs 2x15 reps    Shuttle (added) X  x Double Leg 5 bands 3x10 reps   Single Leg 4 bands 2x10 reps B        Long Arc Quads x 3 lbs x20 reps B                  Recumbent Bike x 6 minutes level 3     Plan for Next Visit:      MANUAL THERAPY TECHNIQUES were applied for (8)  minutes, including:    Manual Intervention Performed Today    Soft Tissue Mobilization [x] right  quadriceps   Joint Mobilizations [x] Inferior and superior patella mobilizations, tibial glides, fat pad mobilizations    []     []    Functional Dry Needling  []      Plan for Next Visit: Continue as needed        NEUROMUSCULAR RE-EDUCATION ACTIVITIES to improve Balance, Coordination, Kinesthetic, Sense, Proprioception, and Posture for (0) minutes.  The following were included:    Intervention Performed Today    Side Lying Hip Series (progressed)  X15 reps each direction (7 ways)   Side Lying Clams (progressed)  Green theraband 10 second holds x10 reps B   Educated patient on proper form and sequencing of all exercises in HEP as well as modifications     Bridges (added)  Green theraband 3x10 reps    Prone Hip Extension (added)  2x10 reps B   Toe Yoga (added)  5 minutes   Towel Curls  X10 reps (education            Plan for Next Visit:       THERAPEUTIC ACTIVITIES to improve dynamic and functional  performance for (15) minutes including:    Intervention Performed Today    Sit to Stands x X30 reps to chair   Standing Hip 3 Ways  x Red theraband x10 reps each direction B                                   Plan for Next Visit:      Patient Education and Home Exercises     Home Exercises Provided and Patient Education Provided     Education provided:   - PURPOSE: Patient educated on the impairments noted above and the effects of physical therapy intervention to improve overall condition and QOL.   EXERCISE: Patient was educated on all the above exercise prior/during/after for proper posture, positioning, and execution for safe performance with home exercise program.   STRENGTH: Patient educated on the importance of improved core and extremity strength in order to improve alignment of the spine and extremities with static positions and dynamic movement.   GAIT & BALANCE: Patient educated on the importance of strong core and  lower extremity musculature in order to improve both static and dynamic balance, improve gait mechanics, reduce fall risk and improve household and community mobility.   POSTURE: Patient educated on postural awareness to reduce stress and maintain optimal alignment of the spine with static positions and dynamic movement     Written Home Exercises Provided: Patient instructed to cont prior HEP. Exercises were reviewed and Althea was able to demonstrate them prior to the end of the session.  Althea demonstrated good  understanding of the education provided. See EMR under Patient Instructions for exercises provided during therapy sessions      ASSESSMENT     Patient tolerated treatment well today with the ability to progress strengthening program as her endurance is improving and is requiring less time to complete each exercise. Incorporated single leg shuttle squats, standing hip series and patient was able to tolerated sit to stands with better endurance and no pain present. Patient with muscle soreness at distal quadriceps with most exercises on right as compared to left. Patient would like to address balance deficits in the coming sessions.     Althea Is progressing well towards her goals.   Pt prognosis is Excellent.     Pt will continue to benefit from skilled outpatient physical therapy to address the deficits listed in the problem list box on initial evaluation, provide pt/family education and to maximize pt's level of independence in the home and community environment.     Pt's spiritual, cultural and educational needs considered and pt agreeable to plan of care and goals.     Anticipated barriers to physical therapy: chronicity of condition    Goals:      Short Term Goals:  6 weeks Status  Date Met   PAIN: Pt will report worst pain of 4/10 in order to progress toward max functional ability and improve quality of life. [x] Progressing  [] Met  [] Not Met     FUNCTION: Patient will demonstrate improved function as  indicated by a functional limitation score of less than or equal to 32%  on FOTO. [x] Progressing  [] Met  [] Not Met     STRENGTH: Patient will improve strength to 50% of stated goals, listed in objective measures above, in order to progress towards independence with functional activities. [x] Progressing  [] Met  [] Not Met     POSTURE: Patient will correct postural deviations in sitting and standing, to decrease pain and promote long term stability.  [x] Progressing  [] Met  [] Not Met     HEP: Patient will demonstrate independence with HEP in order to progress toward functional independence. [x] Progressing  [] Met  [] Not Met     Will assess left foot to see if any deficits are present that could be aiding in current pain and dysfunction to assist in long term improvements in pain.  [x] Progressing  [] Met  [] Not Met        Long Term Goals:  12 weeks Status Date Met   PAIN: Pt will report worst pain of 1/10 in order to progress toward max functional ability and improve quality of life [x] Progressing  [] Met  [] Not Met     FUNCTION: Patient will demonstrate improved function as indicated by a functional limitation score of less than or equal to 27% on FOTO. [x] Progressing  [] Met  [] Not Met     STRENGTH: Patient will improve strength to stated goals, listed in objective measures above, in order to improve functional independence and quality of life. [x] Progressing  [] Met  [] Not Met     GAIT: Patient will demonstrate normalized gait mechanics with minimal compensation in order to return to PLOF. [x] Progressing  [] Met  [] Not Met     Patient will return to normal ADL's, IADL's, community involvement, recreational activities, and work-related activities with less than or equal to 1/10 pain and maximal function.  [x] Progressing  [] Met  [] Not Met          PLAN     Continue POC and frequency as planned. Continue to progress LE strength and conditioning program to tolerance.      These services are  reasonable and necessary for the conditions set forth above while under my care.    Gay Hunter, PT, DPT

## 2023-02-24 ENCOUNTER — CLINICAL SUPPORT (OUTPATIENT)
Dept: REHABILITATION | Facility: HOSPITAL | Age: 63
End: 2023-02-24
Payer: COMMERCIAL

## 2023-02-24 DIAGNOSIS — Z74.09 DECREASED STRENGTH, ENDURANCE, AND MOBILITY: Primary | ICD-10-CM

## 2023-02-24 DIAGNOSIS — R68.89 DECREASED STRENGTH, ENDURANCE, AND MOBILITY: Primary | ICD-10-CM

## 2023-02-24 DIAGNOSIS — R53.1 DECREASED STRENGTH, ENDURANCE, AND MOBILITY: Primary | ICD-10-CM

## 2023-02-24 PROCEDURE — 97112 NEUROMUSCULAR REEDUCATION: CPT

## 2023-02-24 PROCEDURE — 97110 THERAPEUTIC EXERCISES: CPT

## 2023-02-24 PROCEDURE — 97530 THERAPEUTIC ACTIVITIES: CPT

## 2023-02-24 NOTE — PROGRESS NOTES
[facility]   Physical Therapy Treatment Note     Name: Althea HOLLIS [facility] Number: [id number]    Therapy Diagnosis:   Encounter Diagnosis   Name Primary?    Decreased strength, endurance, and mobility Yes     Physician: Aubrey Del Cid MD    Visit Date: 2/24/2023    Physician Orders: PT Eval and Treat  Medical Diagnosis from Referral: M17.11 (ICD-10-CM) - Primary osteoarthritis of right knee  M22.2X1 (ICD-10-CM) - Patellofemoral pain syndrome of right knee  Evaluation Date: 1/20/2023  Authorization Period Expiration: 12/31/2023  Plan of Care Expiration: 4/20/2023  Progress Note Due: 2/20/2023  Visit # / Visits authorized: 5/20 (+1 for evaluation)  FOTO: 1/3 (performed on 1/20/2023)    PTA Visit #: 0/5     Progress Note Due on 2/20/2023 (next visit)    Time In: 1114  Time Out: 1204  Total Billable Time: 48 minutes (denotes billable time)    Precautions: Standard    SUBJECTIVE     Pt reports: that she is feeling better today. Feels like she is getting stronger as her exercises are getting a bit easier at home and not taking her as long. Patient reports that she drove from her mother's house yesterday and with the knee sleeve donned she did not have any pain.     She was compliant with home exercise program.  Response to previous treatment: No adverse reactions  Functional change: Less pain when sitting for long car ride    Pain: 0/10  Location: [x] Right   [] Left:  knee    OBJECTIVE     Objective Measures updated at progress report unless specified.     Treatment     Althea received the treatments listed below:     THERAPEUTIC EXERCISES to develop strength, endurance, ROM, flexibility, posture, and core stabilization for (12) minutes including:    Intervention Performed Today    Straight Leg Raise   3 lbs 2x15 reps    Shuttle (added) X  x Double Leg 6 bands 3x10 reps   Single Leg 5 bands 2x10 reps B        Long Arc Quads  3 lbs x20 reps B                  Recumbent Bike x 6 minutes level  3     Plan for Next Visit:      MANUAL THERAPY TECHNIQUES were applied for (0) minutes, including:    Manual Intervention Performed Today    Soft Tissue Mobilization [] right  quadriceps   Joint Mobilizations [] Inferior and superior patella mobilizations, tibial glides, fat pad mobilizations    []     []    Functional Dry Needling  []      Plan for Next Visit: Continue as needed        NEUROMUSCULAR RE-EDUCATION ACTIVITIES to improve Balance, Coordination, Kinesthetic, Sense, Proprioception, and Posture for (12) minutes.  The following were included:    Intervention Performed Today    Side Lying Hip Series (progressed)  X15 reps each direction (7 ways)   Side Lying Clams (progressed)  Green theraband 10 second holds x10 reps B   Heel Raises 3 Ways (added) x 2x10 reps each working on equal weightbearing   Bridges (added)  Green theraband 3x10 reps    Prone Hip Extension (added)  2x10 reps B   Toe Yoga (added)  5 minutes   Towel Curls  X10 reps (education    Tandem Walking (added) x With long foam pad 4 laps back and forth in parallel bars       Plan for Next Visit:       THERAPEUTIC ACTIVITIES to improve dynamic and functional  performance for (24) minutes including:    Intervention Performed Today    Sit to Stand Taps  x X30 reps to chair   Standing Hip 3 Ways  x Red theraband x10 reps each direction B   Banded Walk Outs (added) x Red theraband 3 laps back and forth in parallel bars    Med Ball Lifts from Chair (addeD) x 10 lbs x10 reps   15 lbs x10 reps    Large Foam Roll Standing Lifts (added) x 2x10 reps                     Plan for Next Visit:      Patient Education and Home Exercises     Home Exercises Provided and Patient Education Provided     Education provided:   - PURPOSE: Patient educated on the impairments noted above and the effects of physical therapy intervention to improve overall condition and QOL.   EXERCISE: Patient was educated on all the above exercise prior/during/after for proper posture,  positioning, and execution for safe performance with home exercise program.   STRENGTH: Patient educated on the importance of improved core and extremity strength in order to improve alignment of the spine and extremities with static positions and dynamic movement.   GAIT & BALANCE: Patient educated on the importance of strong core and lower extremity musculature in order to improve both static and dynamic balance, improve gait mechanics, reduce fall risk and improve household and community mobility.   POSTURE: Patient educated on postural awareness to reduce stress and maintain optimal alignment of the spine with static positions and dynamic movement     Written Home Exercises Provided: Patient instructed to cont prior HEP. Exercises were reviewed and Althea was able to demonstrate them prior to the end of the session.  Althea demonstrated good  understanding of the education provided. See EMR under Patient Instructions for exercises provided during therapy sessions      ASSESSMENT     Patient tolerated treatment well today focusing more on functional strengthening and balance activities while continuing to progress new exercises incorporated last session. Incorporated side rotational lifts along with forward lifts to improve her ability to  grandchildren along with normalizing movement patterns so that she is not favoring her right lower extremity which could also be contributing to continued pain.    Althea Is progressing well towards her goals.   Pt prognosis is Excellent.     Pt will continue to benefit from skilled outpatient physical therapy to address the deficits listed in the problem list box on initial evaluation, provide pt/family education and to maximize pt's level of independence in the home and community environment.     Pt's spiritual, cultural and educational needs considered and pt agreeable to plan of care and goals.     Anticipated barriers to physical therapy: chronicity of condition    Goals:       Short Term Goals:  6 weeks Status  Date Met   PAIN: Pt will report worst pain of 4/10 in order to progress toward max functional ability and improve quality of life. [x] Progressing  [] Met  [] Not Met     FUNCTION: Patient will demonstrate improved function as indicated by a functional limitation score of less than or equal to 32%  on FOTO. [x] Progressing  [] Met  [] Not Met     STRENGTH: Patient will improve strength to 50% of stated goals, listed in objective measures above, in order to progress towards independence with functional activities. [x] Progressing  [] Met  [] Not Met     POSTURE: Patient will correct postural deviations in sitting and standing, to decrease pain and promote long term stability.  [x] Progressing  [] Met  [] Not Met     HEP: Patient will demonstrate independence with HEP in order to progress toward functional independence. [x] Progressing  [] Met  [] Not Met     Will assess left foot to see if any deficits are present that could be aiding in current pain and dysfunction to assist in long term improvements in pain.  [x] Progressing  [] Met  [] Not Met        Long Term Goals:  12 weeks Status Date Met   PAIN: Pt will report worst pain of 1/10 in order to progress toward max functional ability and improve quality of life [x] Progressing  [] Met  [] Not Met     FUNCTION: Patient will demonstrate improved function as indicated by a functional limitation score of less than or equal to 27% on FOTO. [x] Progressing  [] Met  [] Not Met     STRENGTH: Patient will improve strength to stated goals, listed in objective measures above, in order to improve functional independence and quality of life. [x] Progressing  [] Met  [] Not Met     GAIT: Patient will demonstrate normalized gait mechanics with minimal compensation in order to return to PLOF. [x] Progressing  [] Met  [] Not Met     Patient will return to normal ADL's, IADL's, community involvement, recreational activities, and work-related  activities with less than or equal to 1/10 pain and maximal function.  [x] Progressing  [] Met  [] Not Met          PLAN     Continue POC and frequency as planned. Continue to progress LE strength and conditioning program to tolerance.      These services are reasonable and necessary for the conditions set forth above while under my care.    Gay Hunter, PT, DPT

## 2023-03-03 ENCOUNTER — CLINICAL SUPPORT (OUTPATIENT)
Dept: REHABILITATION | Facility: HOSPITAL | Age: 63
End: 2023-03-03
Payer: COMMERCIAL

## 2023-03-03 DIAGNOSIS — Z74.09 DECREASED STRENGTH, ENDURANCE, AND MOBILITY: Primary | ICD-10-CM

## 2023-03-03 DIAGNOSIS — R53.1 DECREASED STRENGTH, ENDURANCE, AND MOBILITY: Primary | ICD-10-CM

## 2023-03-03 DIAGNOSIS — R68.89 DECREASED STRENGTH, ENDURANCE, AND MOBILITY: Primary | ICD-10-CM

## 2023-03-03 PROCEDURE — 97110 THERAPEUTIC EXERCISES: CPT

## 2023-03-03 PROCEDURE — 97530 THERAPEUTIC ACTIVITIES: CPT

## 2023-03-03 NOTE — PROGRESS NOTES
[facility]   Physical Therapy Treatment Note + Discharge Summary    Name: Althea Marcial  Clinic Number: [id number]    Therapy Diagnosis:   Encounter Diagnosis   Name Primary?    Decreased strength, endurance, and mobility Yes     Physician: Aubrey Del Cid MD    Visit Date: 3/3/2023    Physician Orders: PT Eval and Treat  Medical Diagnosis from Referral: M17.11 (ICD-10-CM) - Primary osteoarthritis of right knee  M22.2X1 (ICD-10-CM) - Patellofemoral pain syndrome of right knee  Evaluation Date: 1/20/2023  Authorization Period Expiration: 12/31/2023  Plan of Care Expiration: 4/20/2023  Progress Note Due: 2/20/2023   Visit # / Visits authorized: 6/20 (+1 for evaluation)  FOTO: 1/3 (performed on 1/20/2023)    PTA Visit #: 0/5     Discharging today    Time In: 1114  Time Out: 1158  Total Billable Time: 42 minutes (denotes billable time)    Precautions: Standard    SUBJECTIVE     Pt reports: that she is feeling good. Only has intermittent knee pain but in general feels much stronger and with less frequent and left significant pain since beginning Physical Therapy.     She was compliant with home exercise program.  Response to previous treatment: No adverse reactions  Functional change: Less pain when sitting for long car ride    Pain: 0/10  Location: [x] Right   [] Left:  knee    OBJECTIVE     Objective Measures updated at progress report unless specified.     Hip and Ankle Mobility was screened and was normal and pain free     Knee AROM/PROM Right Left Pain/Dysfunction with Movement   Knee Flexion (135º) 135 138 Pressure   Knee Extension (0º) 0 0 Pressure      Strength:     L/E MMT Right  (spine) Left Pain/Dysfunction with Movement Goal   Hip Flexion  4/5 4/5 No pain 4+/5 B   Hip Extension  4+/5 4+/5 No pain 4+/5 B   Hip Abduction  4/5 4/5 No pain 4+/5 B   Knee Extension 4+/5 4+/5 No pain 5/5 B   Knee Flexion 4+/5 4+/5 No pain 5/5 B   Hip IR 3+/5 3+/5 No pain 4+/5 B   Hip ER 3+/5 3+/5 No pain  4+/5 B   Ankle DF 4/5 4/5 No pain 5/5 B   Ankle PF 4/5 4/5 No pain 5/5 B      Muscle Length:         Muscle Tested  Right Left  Goal   Hamstrings  [] Normal      [x] Limited [] Normal      [x] Limited Normal B   Gastrocnemius  [] Normal      [x] Limited [] Normal      [x] Limited Normal B      Sensation:  [x] Intact to Light Touch                         [] Impaired:     Palpation: Increased tone noted with palpation of right quadriceps, vastus medialis/VMO, medial hamstrings, hip adductors , and popliteus. Increased tenderness with palpation of the following structures: medial tibiofemoral joint line , lateral tibiofemoral joint line , and pes anserine      Posture:  Pt presents with postural abnormalities which include:               [x] Forward Head                                [] Increased Lumbar Lordosis              [x] Rounded Shoulder                         [] Genu Recurvatum              [] Increased Thoracic Kyphosis        [x] Genu Valgus              [] Trunk Deviated                              [] Pes Planus              [] Scapular Winging                          [] Other:         PT Functional Testing: Gait Analysis: The patient ambulated with the following assistive device: none; the pt presents with the following gait abnormalities: trendelenburg, decreased knee flexion on right, and decreased knee extension on right,      Functional Movement  Analysis   Sit to Stand [x]Functional  []Dysfunctional:  []Painful  [x]Non-Painful      Squat [x]Functional  []Dysfunctional:  []Painful  [x]Non-Painful        FOTO:      Treatment     Althea received the treatments listed below:     THERAPEUTIC EXERCISES to develop strength, endurance, ROM, flexibility, posture, and core stabilization for (23) minutes including:    Intervention Performed Today    Straight Leg Raise   3 lbs 2x15 reps    Shuttle (added)  Double Leg 6 bands 3x10 reps   Single Leg 5 bands 2x10 reps B        Long Arc Quads  3 lbs x20 reps B              Re-Assessed Objective Measurements x    Recumbent Bike x 6 minutes level 3     Plan for Next Visit:      MANUAL THERAPY TECHNIQUES were applied for (0) minutes, including:    Manual Intervention Performed Today    Soft Tissue Mobilization [] right  quadriceps   Joint Mobilizations [] Inferior and superior patella mobilizations, tibial glides, fat pad mobilizations    []     []    Functional Dry Needling  []      Plan for Next Visit: Continue as needed        NEUROMUSCULAR RE-EDUCATION ACTIVITIES to improve Balance, Coordination, Kinesthetic, Sense, Proprioception, and Posture for (0) minutes.  The following were included:    Intervention Performed Today    Side Lying Hip Series (progressed)  X15 reps each direction (7 ways)   Side Lying Clams (progressed)  Green theraband 10 second holds x10 reps B   Heel Raises 3 Ways (added)  2x10 reps each working on equal weightbearing   Bridges (added)  Green theraband 3x10 reps    Prone Hip Extension (added)  2x10 reps B   Toe Yoga (added)  5 minutes   Towel Curls  X10 reps (education    Tandem Walking (added)  With long foam pad 4 laps back and forth in parallel bars       Plan for Next Visit:       THERAPEUTIC ACTIVITIES to improve dynamic and functional  performance for (19) minutes including:    Intervention Performed Today    Sit to Stand Taps   X30 reps to chair   Standing Hip 3 Ways (increased resistance) x Green theraband 2x10 reps each direction B   Banded Walk Outs (added) x Red theraband 5 laps back and forth in parallel bars    Med Ball Lifts from Chair (addeD)  10 lbs x10 reps   15 lbs x10 reps    Large Foam Roll Standing Lifts (added)  2x10 reps                     Plan for Next Visit:      Patient Education and Home Exercises     Home Exercises Provided and Patient Education Provided     Education provided:   - PURPOSE: Patient educated on the impairments noted above and the effects of physical therapy intervention to improve overall condition and QOL.    EXERCISE: Patient was educated on all the above exercise prior/during/after for proper posture, positioning, and execution for safe performance with home exercise program.   STRENGTH: Patient educated on the importance of improved core and extremity strength in order to improve alignment of the spine and extremities with static positions and dynamic movement.   GAIT & BALANCE: Patient educated on the importance of strong core and lower extremity musculature in order to improve both static and dynamic balance, improve gait mechanics, reduce fall risk and improve household and community mobility.   POSTURE: Patient educated on postural awareness to reduce stress and maintain optimal alignment of the spine with static positions and dynamic movement     Written Home Exercises Provided: Patient instructed to cont prior HEP. Exercises were reviewed and Althea was able to demonstrate them prior to the end of the session.  Althea demonstrated good  understanding of the education provided. See EMR under Patient Instructions for exercises provided during therapy sessions      ASSESSMENT     Patient tolerated treatment well today focusing on discussing previous exercises and how she is to progress moving forward as she is discharging today. Patient overall has made great progress with therapy and will discharge today to continue with strengthening program at home.     Althea Is progressing well towards her goals.   Pt prognosis is Excellent.     Pt will continue to benefit from skilled outpatient physical therapy to address the deficits listed in the problem list box on initial evaluation, provide pt/family education and to maximize pt's level of independence in the home and community environment.     Pt's spiritual, cultural and educational needs considered and pt agreeable to plan of care and goals.     Anticipated barriers to physical therapy: chronicity of condition    Goals:      Short Term Goals:  6 weeks Status  Date Met    PAIN: Pt will report worst pain of 4/10 in order to progress toward max functional ability and improve quality of life. [] Progressing  [x] Met  [] Not Met  3/3/2023   FUNCTION: Patient will demonstrate improved function as indicated by a functional limitation score of less than or equal to 32%  on FOTO. [] Progressing  [x] Met  [] Not Met 3/3/2023   STRENGTH: Patient will improve strength to 50% of stated goals, listed in objective measures above, in order to progress towards independence with functional activities. [] Progressing  [x] Met  [] Not Met 3/3/2023    POSTURE: Patient will correct postural deviations in sitting and standing, to decrease pain and promote long term stability.  [] Progressing  [x] Met  [] Not Met 3/3/2023    HEP: Patient will demonstrate independence with HEP in order to progress toward functional independence. [] Progressing  [x] Met  [] Not Met  3/3/2023   Will assess left foot to see if any deficits are present that could be aiding in current pain and dysfunction to assist in long term improvements in pain.  [] Progressing  [x] Met  [] Not Met 3/3/2023      Long Term Goals:  12 weeks Status Date Met   PAIN: Pt will report worst pain of 1/10 in order to progress toward max functional ability and improve quality of life [] Progressing  [x] Met  [] Not Met 3/3/2023    FUNCTION: Patient will demonstrate improved function as indicated by a functional limitation score of less than or equal to 27% on FOTO. [] Progressing  [x] Met  [] Not Met 3/3/2023    STRENGTH: Patient will improve strength to stated goals, listed in objective measures above, in order to improve functional independence and quality of life. [x] Progressed Towards  [] Met  [] Not Met 3/3/2023    GAIT: Patient will demonstrate normalized gait mechanics with minimal compensation in order to return to PLOF. [] Progressing  [x] Met  [] Not Met  3/3/2023   Patient will return to normal ADL's, IADL's, community involvement,  recreational activities, and work-related activities with less than or equal to 1/10 pain and maximal function.  [] Progressing  [x] Met  [] Not Met  3/3/2023        PLAN     Discharge today to continue to progress LE strength and conditioning program independently.      These services are reasonable and necessary for the conditions set forth above while under my care.    Gay Hunter, PT, DPT